# Patient Record
Sex: FEMALE | Race: WHITE
[De-identification: names, ages, dates, MRNs, and addresses within clinical notes are randomized per-mention and may not be internally consistent; named-entity substitution may affect disease eponyms.]

---

## 2017-09-12 ENCOUNTER — HOSPITAL ENCOUNTER (EMERGENCY)
Dept: HOSPITAL 62 - ER | Age: 47
Discharge: HOME | End: 2017-09-12
Payer: COMMERCIAL

## 2017-09-12 VITALS — SYSTOLIC BLOOD PRESSURE: 133 MMHG | DIASTOLIC BLOOD PRESSURE: 82 MMHG

## 2017-09-12 DIAGNOSIS — S39.012A: Primary | ICD-10-CM
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
## 2017-09-12 PROCEDURE — 99283 EMERGENCY DEPT VISIT LOW MDM: CPT
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
## 2018-11-26 ENCOUNTER — HOSPITAL ENCOUNTER (EMERGENCY)
Dept: HOSPITAL 62 - ER | Age: 48
Discharge: HOME | End: 2018-11-26
Payer: COMMERCIAL

## 2018-11-26 VITALS — DIASTOLIC BLOOD PRESSURE: 71 MMHG | SYSTOLIC BLOOD PRESSURE: 115 MMHG

## 2018-11-26 DIAGNOSIS — R10.816: ICD-10-CM

## 2018-11-26 DIAGNOSIS — I10: ICD-10-CM

## 2018-11-26 DIAGNOSIS — R05: ICD-10-CM

## 2018-11-26 DIAGNOSIS — F17.200: ICD-10-CM

## 2018-11-26 DIAGNOSIS — J18.9: Primary | ICD-10-CM

## 2018-11-26 DIAGNOSIS — R19.7: ICD-10-CM

## 2018-11-26 DIAGNOSIS — R10.12: ICD-10-CM

## 2018-11-26 DIAGNOSIS — R11.2: ICD-10-CM

## 2018-11-26 LAB
A TYPE INFLUENZA AG: NEGATIVE
ABSOLUTE LYMPHOCYTES# (MANUAL): 0.3 10^3/UL (ref 0.5–4.7)
ABSOLUTE MONOCYTES # (MANUAL): 1.5 10^3/UL (ref 0.1–1.4)
ABSOLUTE NEUTROPHILS# (MANUAL): 14.5 10^3/UL (ref 1.7–8.2)
ADD MANUAL DIFF: YES
ALBUMIN SERPL-MCNC: 3.6 G/DL (ref 3.5–5)
ALP SERPL-CCNC: 106 U/L (ref 38–126)
ALT SERPL-CCNC: 36 U/L (ref 9–52)
ANION GAP SERPL CALC-SCNC: 14 MMOL/L (ref 5–19)
APPEARANCE UR: (no result)
APTT PPP: (no result) S
AST SERPL-CCNC: 57 U/L (ref 14–36)
B INFLUENZA AG: NEGATIVE
BASOPHILS NFR BLD MANUAL: 0 % (ref 0–2)
BILIRUB DIRECT SERPL-MCNC: 0.2 MG/DL (ref 0–0.4)
BILIRUB SERPL-MCNC: 0.3 MG/DL (ref 0.2–1.3)
BILIRUB UR QL STRIP: (no result)
BUN SERPL-MCNC: 6 MG/DL (ref 7–20)
CALCIUM: 8.9 MG/DL (ref 8.4–10.2)
CHLORIDE SERPL-SCNC: 95 MMOL/L (ref 98–107)
CO2 SERPL-SCNC: 30 MMOL/L (ref 22–30)
EOSINOPHIL NFR BLD MANUAL: 0 % (ref 0–6)
ERYTHROCYTE [DISTWIDTH] IN BLOOD BY AUTOMATED COUNT: 12.9 % (ref 11.5–14)
GLUCOSE SERPL-MCNC: 122 MG/DL (ref 75–110)
GLUCOSE UR STRIP-MCNC: NEGATIVE MG/DL
HCT VFR BLD CALC: 45 % (ref 36–47)
HGB BLD-MCNC: 15.5 G/DL (ref 12–15.5)
KETONES UR STRIP-MCNC: NEGATIVE MG/DL
LIPASE SERPL-CCNC: 54.3 U/L (ref 23–300)
MCH RBC QN AUTO: 30.4 PG (ref 27–33.4)
MCHC RBC AUTO-ENTMCNC: 34.4 G/DL (ref 32–36)
MCV RBC AUTO: 88 FL (ref 80–97)
MONOCYTES % (MANUAL): 9 % (ref 3–13)
NEUTS BAND NFR BLD MANUAL: 2 % (ref 3–5)
NITRITE UR QL STRIP: NEGATIVE
PH UR STRIP: 5 [PH] (ref 5–9)
PLATELET # BLD: 281 10^3/UL (ref 150–450)
PLATELET COMMENT: ADEQUATE
POTASSIUM SERPL-SCNC: 3.3 MMOL/L (ref 3.6–5)
PROT SERPL-MCNC: 7.2 G/DL (ref 6.3–8.2)
PROT UR STRIP-MCNC: >=500 MG/DL
RBC # BLD AUTO: 5.09 10^6/UL (ref 3.72–5.28)
RBC MORPH BLD: (no result)
SEGMENTED NEUTROPHILS % (MAN): 87 % (ref 42–78)
SODIUM SERPL-SCNC: 139.1 MMOL/L (ref 137–145)
SP GR UR STRIP: 1.03
TOTAL CELLS COUNTED BLD: 100
TOXIC GRANULES BLD QL SMEAR: SLIGHT
UROBILINOGEN UR-MCNC: 2 MG/DL (ref ?–2)
VARIANT LYMPHS NFR BLD MANUAL: 2 % (ref 13–45)
WBC # BLD AUTO: 16.3 10^3/UL (ref 4–10.5)
WBC TOXIC VACUOLES BLD QL SMEAR: PRESENT

## 2018-11-26 PROCEDURE — 81001 URINALYSIS AUTO W/SCOPE: CPT

## 2018-11-26 PROCEDURE — 96375 TX/PRO/DX INJ NEW DRUG ADDON: CPT

## 2018-11-26 PROCEDURE — 96366 THER/PROPH/DIAG IV INF ADDON: CPT

## 2018-11-26 PROCEDURE — 87804 INFLUENZA ASSAY W/OPTIC: CPT

## 2018-11-26 PROCEDURE — 83690 ASSAY OF LIPASE: CPT

## 2018-11-26 PROCEDURE — 96365 THER/PROPH/DIAG IV INF INIT: CPT

## 2018-11-26 PROCEDURE — 85025 COMPLETE CBC W/AUTO DIFF WBC: CPT

## 2018-11-26 PROCEDURE — 71045 X-RAY EXAM CHEST 1 VIEW: CPT

## 2018-11-26 PROCEDURE — 87040 BLOOD CULTURE FOR BACTERIA: CPT

## 2018-11-26 PROCEDURE — 83605 ASSAY OF LACTIC ACID: CPT

## 2018-11-26 PROCEDURE — 80053 COMPREHEN METABOLIC PANEL: CPT

## 2018-11-26 PROCEDURE — 36415 COLL VENOUS BLD VENIPUNCTURE: CPT

## 2018-11-26 PROCEDURE — 96361 HYDRATE IV INFUSION ADD-ON: CPT

## 2018-11-26 PROCEDURE — 99284 EMERGENCY DEPT VISIT MOD MDM: CPT

## 2018-11-26 NOTE — ER DOCUMENT REPORT
ED General





- General


Chief Complaint: Nausea/Vomiting


Stated Complaint: COUGH, VOMITING, STOMACH PAIN


Time Seen by Provider: 11/26/18 07:49


TRAVEL OUTSIDE OF THE [place] IN LAST 30 DAYS: No





- HPI


Notes: 





Patient is a 48-year-old female that presents to the emergency department for 

chief complaint of nausea vomiting diarrhea and cough.


Patient reports dry cough for the last week.  She has had fever of 103 at home 

a few days ago.  Her last dose of Motrin was at 3 AM this morning.  Patient 

reports since Wednesday she has had nausea vomiting and diarrhea.  She endorses 

a achy left upper quadrant pain.  Her pain is constant.  She denies aggravating 

or relieving factors to her pain.  She denies any dysuria, hematuria and chest 

pain.





Past Medical History: Hypertension, hyperlipidemia, hiatal hernia





Past Surgical History: Denies





Social History: Denies drugs alcohol and tobacco use





Family History: Reviewed and noncontributory for presenting illness





Allergies: Reviewed, see documented allergy list.








REVIEW OF SYSTEMS:





CONSTITUTIONAL : 





fever





chills





No diaphoresis





No recent illness





EENT:





No vision changes





 congestion





No sore throat  





CARDIOVASCULAR:





No chest pain





No palpitations





RESPIRATORY:





No shortness of breath





 cough





No difficulty breathing





GASTROINTESTINAL: 





 abdominal pain





nausea





 vomiting





 diarrhea





GENITOURINARY:





No dysuria





No hematuria





No difficulty urinating





MUSCULOSKELETAL:





No back pain





No leg pain





No arm pain





SKIN:  





No rashes





No lesions





LYMPHATIC: 





No swollen, enlarged glands.





NEUROLOGICAL: 





No lightheadedness





No headache





No weakness





No paresthesias





PSYCHIATRIC:





No anxiety





No depression 








PHYSICAL EXAMINATION:





Vital signs reviewed, nursing noted reviewed.





GENERAL: Well-appearing, well-nourished and in no acute distress.





HEAD: Atraumatic, normocephalic.





EYES: Eyes appear normal, extraocular movements intact, sclera anicteric, 

conjunctiva are normal.





ENT: nares patent, oropharynx clear without exudates.  Dry mucous membranes.





NECK: Normal range of motion, supple without lymphadenopathy





LUNGS: Breath sounds clear to auscultation bilaterally and equal.  No wheezes 

rales or rhonchi.





HEART: Tachycardic and regular rhythm without murmurs





ABDOMEN: Soft, mild epigastric and left-sided abdominal tenderness, normoactive 

bowel sounds.  No rebound, guarding, or rigidity. No masses appreciated.





EXTREMITIES: Nontender, good range of motion, no pitting or edema. 





NEUROLOGICAL: No focal neurological deficits. Moves all extremities 

spontaneously Motor and sensory grossly intact on exam.





PSYCH: Normal mood, normal affect.





SKIN: Warm, Dry, normal turgor, no rashes or lesions noted on exposed skin











- Related Data


Allergies/Adverse Reactions: 


 





No Known Allergies Allergy (Verified 09/14/13 19:05)


 











Past Medical History





- Social History


Smoking Status: Current Every Day Smoker


Family History: Reviewed & Not Pertinent





- Past Medical History


Cardiac Medical History: Reports: Hx Hypercholesterolemia


Past Surgical History: Reports: Hx Tubal Ligation - 1995





Physical Exam





- Vital signs


Vitals: 


 











Temp Pulse Resp BP Pulse Ox


 


 98.8 F   127 H  28 H  138/95 H  96 


 


 11/26/18 07:37  11/26/18 07:37  11/26/18 07:37  11/26/18 07:37  11/26/18 07:37














Course





- Re-evaluation


Re-evalutation: 





11/26/18 08:29


Vitals reviewed.  Nursing notes reviewed.  Patient is afebrile.  She is 

tachycardic and appears dehydrated and was started on IV hydration.  She was 

given Zofran for nausea.  Given Toradol for pain.


11/26/18 10:48


Patient reevaluated.  Her tachycardia has resolved.  Chest x-ray shows a left 

upper lobar pneumonia.  She is oxygenating well on room air.  Her lactic acid 

is normal.  She does have a leukocytosis consistent with her pneumonia.  

Patient had resolution of her Sirs criteria with hydration.  I do not 

clinically feel she is septic.  She will be given a dose of Levaquin IV in the 

emergency room.  She will be discharged home with a prescription for Levaquin.  

She was instructed to follow with her primary care doctor in 1-2 days.  She 

will return to the emergency room for any new or worsening symptoms.  She is in 

agreement with this plan.  Stable at time of discharge.





Laboratory











  11/26/18 11/26/18 11/26/18





  08:25 08:25 08:25


 


WBC  16.3 H  


 


RBC  5.09  


 


Hgb  15.5  


 


Hct  45.0  


 


MCV  88  


 


MCH  30.4  


 


MCHC  34.4  


 


RDW  12.9  


 


Plt Count  281  


 


Total Counted  100  


 


Seg Neutrophils %  Not Reportable  


 


Seg Neuts % (Manual)  87 H  


 


Band Neutrophils %  2 L  


 


Lymphocytes %  Not Reportable  


 


Lymphocytes % (Manual)  2 L  


 


Monocytes %  Not Reportable  


 


Monocytes % (Manual)  9  


 


Eosinophils %  Not Reportable  


 


Eosinophils % (Manual)  0  


 


Basophils %  Not Reportable  


 


Basophils % (Manual)  0  


 


Absolute Neutrophils  Not Reportable  


 


Abs Neuts (Manual)  14.5 H  


 


Absolute Lymphocytes  Not Reportable  


 


Abs Lymphs (Manual)  0.3 L  


 


Absolute Monocytes  Not Reportable  


 


Abs Monocytes (Manual)  1.5 H  


 


Absolute Eosinophils  Not Reportable  


 


Absolute Eos (Manual)  0.0  


 


Absolute Basophils  Not Reportable  


 


Abs Basophils (Manual)  0.0  


 


Toxic Granulation  SLIGHT  


 


Toxic Vacuolation  PRESENT  


 


Platelet Comment  ADEQUATE  


 


RBC Morph Comment  NORMO-CYTIC/CHROMIC  


 


Sodium   139.1 


 


Potassium   3.3 L 


 


Chloride   95 L 


 


Carbon Dioxide   30 


 


Anion Gap   14 


 


BUN   6 L 


 


Creatinine   0.83 


 


Est GFR ( Amer)   > 60 


 


Est GFR (Non-Af Amer)   > 60 


 


Glucose   122 H 


 


Lactic Acid   


 


Calcium   8.9 


 


Total Bilirubin   0.3 


 


Direct Bilirubin   0.2 


 


Neonat Total Bilirubin   Not Reportable 


 


Neonat Direct Bilirubin   Not Reportable 


 


Neonat Indirect Bili   Not Reportable 


 


AST   57 H 


 


ALT   36 


 


Alkaline Phosphatase   106 


 


Total Protein   7.2 


 


Albumin   3.6 


 


Lipase   54.3 


 


Urine Color    DARK YELLOW


 


Urine Appearance    TURBID


 


Urine pH    5.0


 


Ur Specific Gravity    1.026


 


Urine Protein    >=500 H


 


Urine Glucose (UA)    NEGATIVE


 


Urine Ketones    NEGATIVE


 


Urine Blood    MODERATE H


 


Urine Nitrite    NEGATIVE


 


Urine Bilirubin    SMALL H


 


Urine Urobilinogen    2.0 H


 


Ur Leukocyte Esterase    TRACE H


 


Urine WBC (Auto)    44


 


Urine RBC (Auto)    35


 


U Hyaline Cast (Auto)    26


 


Urine Bacteria (Auto)    TRACE


 


Squamous Epi Cells Auto    8


 


Urine Mucus (Auto)    MANY


 


Urine Ascorbic Acid    NEGATIVE


 


Influenza A (Rapid)   


 


Influenza B (Rapid)   














  11/26/18 11/26/18





  08:45 10:08


 


WBC  


 


RBC  


 


Hgb  


 


Hct  


 


MCV  


 


MCH  


 


MCHC  


 


RDW  


 


Plt Count  


 


Total Counted  


 


Seg Neutrophils %  


 


Seg Neuts % (Manual)  


 


Band Neutrophils %  


 


Lymphocytes %  


 


Lymphocytes % (Manual)  


 


Monocytes %  


 


Monocytes % (Manual)  


 


Eosinophils %  


 


Eosinophils % (Manual)  


 


Basophils %  


 


Basophils % (Manual)  


 


Absolute Neutrophils  


 


Abs Neuts (Manual)  


 


Absolute Lymphocytes  


 


Abs Lymphs (Manual)  


 


Absolute Monocytes  


 


Abs Monocytes (Manual)  


 


Absolute Eosinophils  


 


Absolute Eos (Manual)  


 


Absolute Basophils  


 


Abs Basophils (Manual)  


 


Toxic Granulation  


 


Toxic Vacuolation  


 


Platelet Comment  


 


RBC Morph Comment  


 


Sodium  


 


Potassium  


 


Chloride  


 


Carbon Dioxide  


 


Anion Gap  


 


BUN  


 


Creatinine  


 


Est GFR ( Amer)  


 


Est GFR (Non-Af Amer)  


 


Glucose  


 


Lactic Acid   0.8


 


Calcium  


 


Total Bilirubin  


 


Direct Bilirubin  


 


Neonat Total Bilirubin  


 


Neonat Direct Bilirubin  


 


Neonat Indirect Bili  


 


AST  


 


ALT  


 


Alkaline Phosphatase  


 


Total Protein  


 


Albumin  


 


Lipase  


 


Urine Color  


 


Urine Appearance  


 


Urine pH  


 


Ur Specific Gravity  


 


Urine Protein  


 


Urine Glucose (UA)  


 


Urine Ketones  


 


Urine Blood  


 


Urine Nitrite  


 


Urine Bilirubin  


 


Urine Urobilinogen  


 


Ur Leukocyte Esterase  


 


Urine WBC (Auto)  


 


Urine RBC (Auto)  


 


U Hyaline Cast (Auto)  


 


Urine Bacteria (Auto)  


 


Squamous Epi Cells Auto  


 


Urine Mucus (Auto)  


 


Urine Ascorbic Acid  


 


Influenza A (Rapid)  NEGATIVE 


 


Influenza B (Rapid)  NEGATIVE 














 





Chest X-Ray  11/26/18 07:51


IMPRESSION:  Left upper lobe pneumonia


 














- Vital Signs


Vital signs: 


 











Temp Pulse Resp BP Pulse Ox


 


 98.8 F   95   28 H  138/95 H  96 


 


 11/26/18 07:37  11/26/18 10:32  11/26/18 07:37  11/26/18 07:37  11/26/18 07:37














- Laboratory


Result Diagrams: 


 11/26/18 08:25





 11/26/18 08:25


Laboratory results interpreted by me: 


 











  11/26/18 11/26/18 11/26/18





  08:25 08:25 08:25


 


WBC  16.3 H  


 


Seg Neuts % (Manual)  87 H  


 


Band Neutrophils %  2 L  


 


Lymphocytes % (Manual)  2 L  


 


Abs Neuts (Manual)  14.5 H  


 


Abs Lymphs (Manual)  0.3 L  


 


Abs Monocytes (Manual)  1.5 H  


 


Potassium   3.3 L 


 


Chloride   95 L 


 


BUN   6 L 


 


Glucose   122 H 


 


AST   57 H 


 


Urine Protein    >=500 H


 


Urine Blood    MODERATE H


 


Urine Bilirubin    SMALL H


 


Urine Urobilinogen    2.0 H


 


Ur Leukocyte Esterase    TRACE H














Discharge





- Discharge


Clinical Impression: 


 Pneumonia of left upper lobe due to infectious organism





Nausea and vomiting


Qualifiers:


 Vomiting type: unspecified Vomiting Intractability: non-intractable Qualified 

Code(s): R11.2 - Nausea with vomiting, unspecified





Abdominal pain


Qualifiers:


 Abdominal location: left upper quadrant Qualified Code(s): R10.12 - Left upper 

quadrant pain





Condition: Stable


Disposition: HOME, SELF-CARE


Instructions:  Pneumonia ([facility]), Dehydration ([facility])


Additional Instructions: 


Please return to the emergency department if you have any worsening, or concern 

of your symptoms.





Please return to the emergency department if you develop chest pain, difficulty 

breathing, severe abdominal pain, or ongoing vomiting.





Please follow-up with your primary care physician in 2-3 days and any other 

recommended physicians.





If prescribed, take all medications as directed. 





If you have any questions or concerns do not hesitate to return the emergency 

department for evaluation.





[]





Prescriptions: 


Levofloxacin [Levaquin 750 mg Tablet] 750 mg PO DAILY #10 tab


Forms:  Return to Work, Parent Work Note


Referrals: 


YAO YAÑEZ MD [Primary Care Provider] - Follow up tomorrow

## 2018-11-26 NOTE — RADIOLOGY REPORT (SQ)
EXAM DESCRIPTION:  CHEST SINGLE VIEW



COMPLETED DATE/TIME:  11/26/2018 8:03 am



REASON FOR STUDY:  cough



COMPARISON:  [facility] chest 9/14/2013



EXAM PARAMETERS:  NUMBER OF VIEWS: One view.

TECHNIQUE: Single frontal radiographic view of the chest acquired.

RADIATION DOSE: NA

LIMITATIONS: None.



FINDINGS:  LUNGS AND PLEURA: Diffuse airspace disease left upper lobe worrisome for acute pneumonia.

Remainder of the lungs are otherwise clear.  No pleural effusions.  No pneumothorax.

MEDIASTINUM AND HILAR STRUCTURES: No masses.  Contour normal.

HEART AND VASCULAR STRUCTURES: Heart normal in size.  Normal vasculature.

BONES: No acute findings.

HARDWARE: None in the chest.

OTHER: No other significant finding.



IMPRESSION:  Left upper lobe pneumonia



TECHNICAL DOCUMENTATION:  JOB ID:  [id number]

 2011 [employer]- All Rights Reserved



Reading location - IP/workstation name: [email or web address]